# Patient Record
Sex: MALE | Race: WHITE | URBAN - METROPOLITAN AREA
[De-identification: names, ages, dates, MRNs, and addresses within clinical notes are randomized per-mention and may not be internally consistent; named-entity substitution may affect disease eponyms.]

---

## 2020-07-17 ENCOUNTER — OFFICE VISIT (OUTPATIENT)
Dept: FAMILY MEDICINE CLINIC | Facility: CLINIC | Age: 45
End: 2020-07-17
Payer: COMMERCIAL

## 2020-07-17 VITALS
SYSTOLIC BLOOD PRESSURE: 140 MMHG | RESPIRATION RATE: 18 BRPM | DIASTOLIC BLOOD PRESSURE: 82 MMHG | HEART RATE: 68 BPM | WEIGHT: 185 LBS | OXYGEN SATURATION: 98 % | TEMPERATURE: 98 F

## 2020-07-17 DIAGNOSIS — M70.21 OLECRANON BURSITIS OF RIGHT ELBOW: Primary | ICD-10-CM

## 2020-07-17 PROCEDURE — 3077F SYST BP >= 140 MM HG: CPT | Performed by: NURSE PRACTITIONER

## 2020-07-17 PROCEDURE — 3079F DIAST BP 80-89 MM HG: CPT | Performed by: NURSE PRACTITIONER

## 2020-07-17 PROCEDURE — 99202 OFFICE O/P NEW SF 15 MIN: CPT | Performed by: NURSE PRACTITIONER

## 2020-07-17 NOTE — PROGRESS NOTES
Patient presents with:  Elbow Pain: right elbow swelling today    HPI:     Armida Silva is a 39year old male who presents today with a chief complaint of  right elbow pain and swelling.   Cause - unknown  Onset - today  Location of pain - elbow  Pain no  - tenderness over proximal forearm/head of radius: no  - Supination: intact  - Pronation: intact  - Extension : intact  - Flexion: intact  - olecranon bursitis: positive  - Tendons intact: yes  - Rpulses: 2+  - Cap refill: < 2 sec  - sensation: intact

## 2020-07-17 NOTE — PATIENT INSTRUCTIONS
Follow up for worsening symptoms or no improvement    Bursitis of the Elbow [facility]  Your elbow contains a small fluid-filled sac called a bursa. The bursa helps the muscles and tendons move smoothly over the bone.  It also cushions and protects your · Rest your elbow to give it time to heal. You may need to wear an elbow pad to help protect and limit the movement of your elbow. During and after healing, avoid leaning on your elbows.   Follow-up care  Follow up with your healthcare provider, or as advis